# Patient Record
Sex: FEMALE | Race: WHITE | URBAN - METROPOLITAN AREA
[De-identification: names, ages, dates, MRNs, and addresses within clinical notes are randomized per-mention and may not be internally consistent; named-entity substitution may affect disease eponyms.]

---

## 2017-05-03 ENCOUNTER — NEW PATIENT (OUTPATIENT)
Dept: URBAN - METROPOLITAN AREA CLINIC 30 | Facility: CLINIC | Age: 72
End: 2017-05-03
Payer: COMMERCIAL

## 2017-05-03 DIAGNOSIS — H25.813 COMBINED FORMS OF AGE-RELATED CATARACT, BILATERAL: ICD-10-CM

## 2017-05-03 PROCEDURE — 92134 CPTRZ OPH DX IMG PST SGM RTA: CPT | Performed by: OPTOMETRIST

## 2017-05-03 PROCEDURE — 83861 MICROFLUID ANALY TEARS: CPT | Performed by: OPTOMETRIST

## 2017-05-03 RX ORDER — LEVOTHYROXINE SODIUM 50 UG/1
CAPSULE ORAL
Qty: 0 | Refills: 0 | Status: INACTIVE
Start: 2017-05-03 | End: 2017-06-06

## 2017-05-03 RX ORDER — CHOLECALCIFEROL (VITAMIN D3) 50 MCG
CAPSULE ORAL
Qty: 0 | Refills: 0 | Status: INACTIVE
Start: 2017-05-03 | End: 2018-03-27

## 2017-05-03 RX ORDER — IBUPROFEN 200 MG/1
200 MG TABLET, COATED ORAL
Qty: 0 | Refills: 0 | Status: INACTIVE
Start: 2017-05-03 | End: 2018-09-25

## 2017-05-03 RX ORDER — POLYETHYLENE GLYCOL 400 AND PROPYLENE GLYCOL 4; 3 MG/ML; MG/ML
SOLUTION/ DROPS OPHTHALMIC
Qty: 0 | Refills: 0 | Status: INACTIVE
Start: 2017-05-03 | End: 2021-01-07

## 2017-05-03 RX ORDER — DIPHENHYDRAMINE HCL 2 %
25 MG CREAM (GRAM) TOPICAL
Qty: 0 | Refills: 0 | Status: INACTIVE
Start: 2017-05-03 | End: 2018-09-25

## 2017-05-03 RX ORDER — OMEPRAZOLE 20 MG/1
20 MG TABLET, DELAYED RELEASE ORAL
Qty: 0 | Refills: 0 | Status: INACTIVE
Start: 2017-05-03 | End: 2018-09-25

## 2017-05-03 RX ORDER — METOPROLOL TARTRATE 100 MG/1
100 MG TABLET, FILM COATED ORAL
Qty: 0 | Refills: 0 | Status: INACTIVE
Start: 2017-05-03 | End: 2018-09-25

## 2017-05-03 RX ORDER — ZOLPIDEM TARTRATE 10 MG/1
10 MG TABLET, FILM COATED ORAL
Qty: 0 | Refills: 0 | Status: INACTIVE
Start: 2017-05-03 | End: 2018-07-03

## 2017-05-03 RX ORDER — BENAZEPRIL HYDROCHLORIDE 40 MG/1
40 MG TABLET, COATED ORAL
Qty: 0 | Refills: 0 | Status: INACTIVE
Start: 2017-05-03 | End: 2018-09-25

## 2017-05-03 RX ORDER — PREDNISOLONE ACETATE 10 MG/ML
1 % SUSPENSION/ DROPS OPHTHALMIC
Qty: 5 | Refills: 0 | Status: INACTIVE
Start: 2017-05-03 | End: 2017-06-22

## 2017-05-03 RX ORDER — SERTRALINE HYDROCHLORIDE 50 MG/1
50 MG TABLET, FILM COATED ORAL
Qty: 0 | Refills: 0 | Status: INACTIVE
Start: 2017-05-03 | End: 2018-03-27

## 2017-05-03 ASSESSMENT — KERATOMETRY
OS: 42.97
OD: 42.85

## 2017-05-03 ASSESSMENT — VISUAL ACUITY
OS: 20/60
OD: 20/40

## 2017-05-03 ASSESSMENT — INTRAOCULAR PRESSURE
OD: 11
OS: 10

## 2017-06-01 ENCOUNTER — FOLLOW UP ESTABLISHED (OUTPATIENT)
Dept: URBAN - METROPOLITAN AREA CLINIC 24 | Facility: CLINIC | Age: 72
End: 2017-06-01
Payer: MEDICARE

## 2017-06-01 DIAGNOSIS — H16.223 KERATOCONJUNCTIVITIS SICCA, BILATERAL: Primary | ICD-10-CM

## 2017-06-01 PROCEDURE — 92012 INTRM OPH EXAM EST PATIENT: CPT | Performed by: OPTOMETRIST

## 2017-06-01 PROCEDURE — 68761 CLOSE TEAR DUCT OPENING: CPT | Performed by: OPTOMETRIST

## 2017-06-06 ENCOUNTER — Encounter (OUTPATIENT)
Dept: URBAN - METROPOLITAN AREA CLINIC 30 | Facility: CLINIC | Age: 72
End: 2017-06-06
Payer: MEDICARE

## 2017-06-06 PROCEDURE — 92025 CPTRIZED CORNEAL TOPOGRAPHY: CPT | Performed by: OPHTHALMOLOGY

## 2017-06-06 PROCEDURE — 99213 OFFICE O/P EST LOW 20 MIN: CPT | Performed by: NURSE PRACTITIONER

## 2017-06-06 RX ORDER — DILTIAZEM HYDROCHLORIDE 240 MG/1
240 MG CAPSULE, EXTENDED RELEASE ORAL
Qty: 0 | Refills: 0 | Status: INACTIVE
Start: 2017-06-06 | End: 2017-06-06

## 2017-06-09 ENCOUNTER — FOLLOW UP ESTABLISHED (OUTPATIENT)
Dept: URBAN - METROPOLITAN AREA CLINIC 24 | Facility: CLINIC | Age: 72
End: 2017-06-09
Payer: MEDICARE

## 2017-06-09 DIAGNOSIS — H02.055 TRICHIASIS WITHOUT ENTROPION, LEFT LOWER LID: ICD-10-CM

## 2017-06-09 DIAGNOSIS — H25.812 COMBINED FORMS OF AGE-RELATED CATARACT, LEFT EYE: Primary | ICD-10-CM

## 2017-06-09 DIAGNOSIS — M79.7 FIBROMYALGIA: ICD-10-CM

## 2017-06-09 DIAGNOSIS — H25.811 COMBINED FORMS OF AGE-RELATED CATARACT, RIGHT EYE: ICD-10-CM

## 2017-06-09 DIAGNOSIS — H43.392 OTHER VITREOUS OPACITIES, LEFT EYE: ICD-10-CM

## 2017-06-09 PROCEDURE — 92014 COMPRE OPH EXAM EST PT 1/>: CPT | Performed by: OPHTHALMOLOGY

## 2017-06-09 ASSESSMENT — INTRAOCULAR PRESSURE
OD: 13
OS: 14

## 2017-06-16 ENCOUNTER — SURGERY (OUTPATIENT)
Dept: URBAN - METROPOLITAN AREA SURGERY 12 | Facility: SURGERY | Age: 72
End: 2017-06-16
Payer: MEDICARE

## 2017-06-17 ENCOUNTER — POST OP (OUTPATIENT)
Dept: URBAN - METROPOLITAN AREA CLINIC 24 | Facility: CLINIC | Age: 72
End: 2017-06-17

## 2017-06-17 PROCEDURE — 99024 POSTOP FOLLOW-UP VISIT: CPT | Performed by: OPTOMETRIST

## 2017-06-17 ASSESSMENT — INTRAOCULAR PRESSURE: OS: 24

## 2017-06-22 ENCOUNTER — POST OP (OUTPATIENT)
Dept: URBAN - METROPOLITAN AREA CLINIC 30 | Facility: CLINIC | Age: 72
End: 2017-06-22
Payer: MEDICARE

## 2017-06-22 PROCEDURE — 99024 POSTOP FOLLOW-UP VISIT: CPT | Performed by: OPTOMETRIST

## 2017-06-22 ASSESSMENT — INTRAOCULAR PRESSURE
OD: 13
OS: 13

## 2017-06-22 ASSESSMENT — VISUAL ACUITY
OS: 20/20
OD: 20/40

## 2017-06-28 ENCOUNTER — SURGERY (OUTPATIENT)
Dept: URBAN - METROPOLITAN AREA SURGERY 12 | Facility: SURGERY | Age: 72
End: 2017-06-28
Payer: MEDICARE

## 2017-06-29 ENCOUNTER — POST OP (OUTPATIENT)
Dept: URBAN - METROPOLITAN AREA CLINIC 30 | Facility: CLINIC | Age: 72
End: 2017-06-29

## 2017-06-29 PROCEDURE — 99024 POSTOP FOLLOW-UP VISIT: CPT | Performed by: OPTOMETRIST

## 2017-06-29 ASSESSMENT — INTRAOCULAR PRESSURE
OS: 14
OD: 20

## 2017-07-12 ENCOUNTER — POST OP (OUTPATIENT)
Dept: URBAN - METROPOLITAN AREA CLINIC 30 | Facility: CLINIC | Age: 72
End: 2017-07-12

## 2017-07-12 PROCEDURE — 99024 POSTOP FOLLOW-UP VISIT: CPT | Performed by: OPTOMETRIST

## 2017-07-12 ASSESSMENT — VISUAL ACUITY
OD: 20/20
OS: 20/20

## 2017-07-12 ASSESSMENT — INTRAOCULAR PRESSURE
OD: 16
OS: 16

## 2017-09-08 ENCOUNTER — POST OP (OUTPATIENT)
Dept: URBAN - METROPOLITAN AREA CLINIC 30 | Facility: CLINIC | Age: 72
End: 2017-09-08
Payer: COMMERCIAL

## 2017-09-08 PROCEDURE — 99024 POSTOP FOLLOW-UP VISIT: CPT | Performed by: OPTOMETRIST

## 2017-09-08 RX ORDER — PREDNISOLONE ACETATE 10 MG/ML
1 % SUSPENSION/ DROPS OPHTHALMIC
Qty: 1 | Refills: 0 | Status: INACTIVE
Start: 2017-09-08 | End: 2017-10-26

## 2017-09-08 ASSESSMENT — INTRAOCULAR PRESSURE
OS: 11
OD: 11

## 2017-09-08 ASSESSMENT — VISUAL ACUITY
OS: 20/20
OD: 20/20

## 2017-11-03 ENCOUNTER — FOLLOW UP ESTABLISHED (OUTPATIENT)
Dept: URBAN - METROPOLITAN AREA CLINIC 24 | Facility: CLINIC | Age: 72
End: 2017-11-03
Payer: COMMERCIAL

## 2017-11-03 DIAGNOSIS — H43.812 VITREOUS DEGENERATION, LEFT EYE: Primary | ICD-10-CM

## 2017-11-03 DIAGNOSIS — M35.01 SICCA SYNDROME WITH KERATOCONJUNCTIVITIS: ICD-10-CM

## 2017-11-03 DIAGNOSIS — C50.919: ICD-10-CM

## 2017-11-03 DIAGNOSIS — Z96.1 PRESENCE OF INTRAOCULAR LENS: ICD-10-CM

## 2017-11-03 PROCEDURE — 92014 COMPRE OPH EXAM EST PT 1/>: CPT | Performed by: OPTOMETRIST

## 2017-11-03 ASSESSMENT — INTRAOCULAR PRESSURE
OS: 10
OD: 10

## 2018-02-08 ENCOUNTER — FOLLOW UP ESTABLISHED (OUTPATIENT)
Dept: URBAN - METROPOLITAN AREA CLINIC 30 | Facility: CLINIC | Age: 73
End: 2018-02-08
Payer: COMMERCIAL

## 2018-02-08 DIAGNOSIS — H00.025 HORDEOLUM INTERNUM (MEIBOMIAN GLAND DYSFUNCTION), LEFT LOWER LID: ICD-10-CM

## 2018-02-08 PROCEDURE — 0330T TEAR FILM IMG UNI/BI W/I&R: CPT | Performed by: OPTOMETRIST

## 2018-02-08 PROCEDURE — 92012 INTRM OPH EXAM EST PATIENT: CPT | Performed by: OPTOMETRIST

## 2018-02-08 RX ORDER — PREDNISOLONE ACETATE 10 MG/ML
1 % SUSPENSION/ DROPS OPHTHALMIC
Qty: 5 | Refills: 0 | Status: INACTIVE
Start: 2018-02-08 | End: 2018-07-03

## 2018-03-05 ENCOUNTER — FOLLOW UP ESTABLISHED (OUTPATIENT)
Dept: URBAN - METROPOLITAN AREA CLINIC 30 | Facility: CLINIC | Age: 73
End: 2018-03-05
Payer: COMMERCIAL

## 2018-03-05 DIAGNOSIS — H00.022 HORDEOLUM INTERNUM (MEIBOMIAN GLAND DYSFUNCTION), RIGHT LOWER LID: ICD-10-CM

## 2018-03-05 DIAGNOSIS — H00.024 HORDEOLUM INTERNUM (MEIBOMIAN GLAND DYSFUNCTION), LEFT UPPER LID: ICD-10-CM

## 2018-03-05 PROCEDURE — 92012 INTRM OPH EXAM EST PATIENT: CPT | Performed by: OPTOMETRIST

## 2018-03-05 PROCEDURE — 83861 MICROFLUID ANALY TEARS: CPT | Performed by: OPTOMETRIST

## 2018-03-05 ASSESSMENT — INTRAOCULAR PRESSURE
OS: 13
OD: 14

## 2018-03-27 ENCOUNTER — CONSULT (OUTPATIENT)
Dept: URBAN - METROPOLITAN AREA CLINIC 26 | Facility: CLINIC | Age: 73
End: 2018-03-27
Payer: COMMERCIAL

## 2018-03-27 PROCEDURE — 92285 EXTERNAL OCULAR PHOTOGRAPHY: CPT | Performed by: OPHTHALMOLOGY

## 2018-03-27 PROCEDURE — 99214 OFFICE O/P EST MOD 30 MIN: CPT | Performed by: OPHTHALMOLOGY

## 2018-03-27 PROCEDURE — 67820 REVISE EYELASHES: CPT | Performed by: OPHTHALMOLOGY

## 2018-05-22 ENCOUNTER — FOLLOW UP ESTABLISHED (OUTPATIENT)
Dept: URBAN - METROPOLITAN AREA CLINIC 26 | Facility: CLINIC | Age: 73
End: 2018-05-22
Payer: COMMERCIAL

## 2018-05-22 PROCEDURE — 67825 REVISE EYELASHES: CPT | Performed by: OPHTHALMOLOGY

## 2018-05-22 PROCEDURE — 92012 INTRM OPH EXAM EST PATIENT: CPT | Performed by: OPHTHALMOLOGY

## 2018-05-22 PROCEDURE — 92285 EXTERNAL OCULAR PHOTOGRAPHY: CPT | Performed by: OPHTHALMOLOGY

## 2018-07-03 ENCOUNTER — FOLLOW UP ESTABLISHED (OUTPATIENT)
Dept: URBAN - METROPOLITAN AREA CLINIC 30 | Facility: CLINIC | Age: 73
End: 2018-07-03
Payer: COMMERCIAL

## 2018-07-03 DIAGNOSIS — H00.021 HORDEOLUM INTERNUM (MEIBOMIAN GLAND DYSFUNCTION), RIGHT UPPER LID: ICD-10-CM

## 2018-07-03 PROCEDURE — 99213 OFFICE O/P EST LOW 20 MIN: CPT | Performed by: OPTOMETRIST

## 2018-09-25 ENCOUNTER — FOLLOW UP ESTABLISHED (OUTPATIENT)
Dept: URBAN - METROPOLITAN AREA CLINIC 30 | Facility: CLINIC | Age: 73
End: 2018-09-25
Payer: COMMERCIAL

## 2018-09-25 PROCEDURE — 99282 EMERGENCY DEPT VISIT SF MDM: CPT | Performed by: OPTOMETRIST

## 2018-09-25 PROCEDURE — 68761 CLOSE TEAR DUCT OPENING: CPT | Performed by: OPTOMETRIST

## 2018-09-25 RX ORDER — PREDNISOLONE ACETATE 10 MG/ML
1 % SUSPENSION/ DROPS OPHTHALMIC
Qty: 15 | Refills: 0 | Status: INACTIVE
Start: 2018-09-25 | End: 2020-02-21

## 2018-09-25 ASSESSMENT — VISUAL ACUITY
OS: 20/20
OD: 20/20

## 2018-09-25 ASSESSMENT — INTRAOCULAR PRESSURE
OS: 14
OD: 14

## 2018-09-25 ASSESSMENT — KERATOMETRY
OS: 42.67
OD: 42.86

## 2018-12-31 ENCOUNTER — FOLLOW UP ESTABLISHED (OUTPATIENT)
Dept: URBAN - METROPOLITAN AREA CLINIC 30 | Facility: CLINIC | Age: 73
End: 2018-12-31
Payer: COMMERCIAL

## 2018-12-31 PROCEDURE — 68761 CLOSE TEAR DUCT OPENING: CPT | Performed by: OPTOMETRIST

## 2018-12-31 ASSESSMENT — INTRAOCULAR PRESSURE
OS: 11
OD: 12

## 2020-02-21 ENCOUNTER — FOLLOW UP ESTABLISHED (OUTPATIENT)
Dept: URBAN - METROPOLITAN AREA CLINIC 26 | Facility: CLINIC | Age: 75
End: 2020-02-21
Payer: MEDICARE

## 2020-02-21 PROCEDURE — 67820 REVISE EYELASHES: CPT | Performed by: OPTOMETRIST

## 2020-02-21 PROCEDURE — 92012 INTRM OPH EXAM EST PATIENT: CPT | Performed by: OPTOMETRIST

## 2020-02-21 RX ORDER — PREDNISOLONE ACETATE 10 MG/ML
1 % SUSPENSION/ DROPS OPHTHALMIC
Qty: 1 | Refills: 3 | Status: INACTIVE
Start: 2020-02-21 | End: 2020-10-23

## 2020-02-21 ASSESSMENT — INTRAOCULAR PRESSURE
OD: 13
OS: 13

## 2020-03-13 ENCOUNTER — FOLLOW UP ESTABLISHED (OUTPATIENT)
Dept: URBAN - METROPOLITAN AREA CLINIC 26 | Facility: CLINIC | Age: 75
End: 2020-03-13
Payer: MEDICARE

## 2020-03-13 DIAGNOSIS — H43.393 OTHER VITREOUS OPACITIES, BILATERAL: ICD-10-CM

## 2020-03-13 PROCEDURE — 92134 CPTRZ OPH DX IMG PST SGM RTA: CPT | Performed by: OPTOMETRIST

## 2020-03-13 PROCEDURE — 67820 REVISE EYELASHES: CPT | Performed by: OPTOMETRIST

## 2020-03-13 PROCEDURE — 92014 COMPRE OPH EXAM EST PT 1/>: CPT | Performed by: OPTOMETRIST

## 2020-03-13 ASSESSMENT — KERATOMETRY
OS: 42.88
OD: 43.00

## 2020-03-13 ASSESSMENT — VISUAL ACUITY
OD: 20/20
OS: 20/20

## 2020-03-13 ASSESSMENT — INTRAOCULAR PRESSURE
OD: 13
OS: 14

## 2020-10-23 ENCOUNTER — FOLLOW UP ESTABLISHED (OUTPATIENT)
Dept: URBAN - METROPOLITAN AREA CLINIC 26 | Facility: CLINIC | Age: 75
End: 2020-10-23
Payer: MEDICARE

## 2020-10-23 DIAGNOSIS — H52.4 PRESBYOPIA: ICD-10-CM

## 2020-10-23 DIAGNOSIS — H26.493 OTHER SECONDARY CATARACT, BILATERAL: ICD-10-CM

## 2020-10-23 DIAGNOSIS — H02.839 DERMATOCHALASIS OF UNSPECIFIED EYE, UNSPECIFIED EYELID: ICD-10-CM

## 2020-10-23 DIAGNOSIS — H35.3131 NONEXUDATIVE AGE-RELATED MACULAR DEGENERATION, BILATERAL, EARLY DRY STAGE: ICD-10-CM

## 2020-10-23 DIAGNOSIS — H16.143 PUNCTATE KERATITIS, BILATERAL: ICD-10-CM

## 2020-10-23 PROCEDURE — 92015 DETERMINE REFRACTIVE STATE: CPT | Performed by: OPTOMETRIST

## 2020-10-23 PROCEDURE — 92014 COMPRE OPH EXAM EST PT 1/>: CPT | Performed by: OPTOMETRIST

## 2020-10-23 PROCEDURE — 92134 CPTRZ OPH DX IMG PST SGM RTA: CPT | Performed by: OPTOMETRIST

## 2020-10-23 RX ORDER — PREDNISOLONE ACETATE 10 MG/ML
1 % SUSPENSION/ DROPS OPHTHALMIC
Qty: 0 | Refills: 0 | Status: INACTIVE
Start: 2020-10-23 | End: 2021-01-07

## 2020-10-23 ASSESSMENT — KERATOMETRY
OS: 42.75
OD: 43.00

## 2020-10-23 ASSESSMENT — INTRAOCULAR PRESSURE
OS: 19
OD: 17

## 2020-10-23 ASSESSMENT — VISUAL ACUITY
OD: 20/20
OS: 20/20

## 2021-01-07 ENCOUNTER — FOLLOW UP ESTABLISHED (OUTPATIENT)
Dept: URBAN - METROPOLITAN AREA CLINIC 30 | Facility: CLINIC | Age: 76
End: 2021-01-07
Payer: COMMERCIAL

## 2021-01-07 PROCEDURE — 92012 INTRM OPH EXAM EST PATIENT: CPT | Performed by: OPTOMETRIST

## 2021-01-07 PROCEDURE — 83861 MICROFLUID ANALY TEARS: CPT | Performed by: OPTOMETRIST

## 2021-01-07 RX ORDER — CYCLOSPORINE 0.5 MG/ML
0.05 % EMULSION OPHTHALMIC
Qty: 60 | Refills: 6 | Status: ACTIVE
Start: 2021-01-07

## 2021-01-07 RX ORDER — PREDNISOLONE ACETATE 10 MG/ML
1 % SUSPENSION/ DROPS OPHTHALMIC
Qty: 5 | Refills: 0 | Status: INACTIVE
Start: 2021-01-07 | End: 2021-03-08

## 2021-01-07 ASSESSMENT — INTRAOCULAR PRESSURE
OS: 13
OD: 15

## 2021-03-05 ENCOUNTER — FOLLOW UP ESTABLISHED (OUTPATIENT)
Dept: URBAN - METROPOLITAN AREA CLINIC 30 | Facility: CLINIC | Age: 76
End: 2021-03-05
Payer: COMMERCIAL

## 2021-03-05 PROCEDURE — 83861 MICROFLUID ANALY TEARS: CPT | Performed by: OPTOMETRIST

## 2021-03-05 ASSESSMENT — INTRAOCULAR PRESSURE
OD: 15
OS: 16

## 2021-05-03 ENCOUNTER — OFFICE VISIT (OUTPATIENT)
Dept: URBAN - METROPOLITAN AREA CLINIC 30 | Facility: CLINIC | Age: 76
End: 2021-05-03
Payer: COMMERCIAL

## 2021-05-03 PROCEDURE — 99214 OFFICE O/P EST MOD 30 MIN: CPT | Performed by: OPTOMETRIST

## 2021-05-03 RX ORDER — PREDNISOLONE ACETATE 10 MG/ML
1 % SUSPENSION/ DROPS OPHTHALMIC
Qty: 5 | Refills: 1 | Status: INACTIVE
Start: 2021-05-03 | End: 2021-05-04

## 2021-05-03 ASSESSMENT — INTRAOCULAR PRESSURE
OS: 16
OD: 16

## 2021-05-03 NOTE — IMPRESSION/PLAN
Impression: Sicca syndrome with keratoconjunctivitis: OU +Sjogrens ****Lipiview results 90% atrohy OU****
03/2021 Osmo 300, 305
01/2021 Osmo [phone]
01/2021 Schirmers 6,5 Schirmers-10,16 Plan: Pt notes excessive tears OS > OD and FBS OS since BUL plugs placed. Symptoms persists OU. Continue oil-based AT's qid OU- Using Systane complete, Systane ultra, Hydrate PF, TID OU. O3's. Avoid ceiling fans. Finished PA 1% OU-restart-RX'd, pt will use PRN w periodic IOP check. ***Pt ed risk of steroid induced GLC. Pt states will only use for 2-3 day period then d/c. Intolerant to Restasis and Xiidra- Re challenge Restasis consider AT's after. [place] Oil PRN-  feels helpful. Hx of allergies-cont Pataday tid OU. Cont WC's w blinking. Recommend IPL x 2 first to see response. RTC 4 months IOP check 03/2021 BUL 0.5mm UltraPlugs placed-**removed KRUNAL today due to excessive tearing and FBS.**
12/2018 replaced large Odyssey BLL, recommend cautery if displace-removed RLL for now 3/5/21-some mucus. Pt currently taking edible CBD & THC for hand and foot neuropathy which pt was told would cause major dryness. DEC 01/2021. Lipiview results 90% atrophy OU. Discussed options for treatment such as IPL x4 in order to maintain MG function. Pt aware of out of pocket cost $350.

## 2021-10-05 ENCOUNTER — OFFICE VISIT (OUTPATIENT)
Dept: URBAN - METROPOLITAN AREA CLINIC 30 | Facility: CLINIC | Age: 76
End: 2021-10-05
Payer: COMMERCIAL

## 2021-10-05 PROCEDURE — 99213 OFFICE O/P EST LOW 20 MIN: CPT | Performed by: OPTOMETRIST

## 2021-10-05 RX ORDER — PREDNISOLONE ACETATE 10 MG/ML
1 % SUSPENSION/ DROPS OPHTHALMIC
Qty: 5 | Refills: 0 | Status: ACTIVE
Start: 2021-10-05

## 2021-10-05 ASSESSMENT — INTRAOCULAR PRESSURE
OD: 15
OS: 14

## 2021-10-05 NOTE — IMPRESSION/PLAN
Impression: Hordeolum internum (Meibomian gland dysfunction), right upper lid Plan: See other notes. Cont 's qhs OU.

## 2021-10-05 NOTE — IMPRESSION/PLAN
Impression: Sicca syndrome with keratoconjunctivitis: OU +Sjogrens ****Lipiview results 90% atrohy OU****
03/2021 Osmo 300, 305
01/2021 Osmo [phone]
01/2021 Schirmers 6,5 Schirmers-10,16 Plan:  Symptoms persists OU. Continue oil-based AT's qid OU- Using Systane complete OU, and Refresh Optive- omega 3. O3's. Avoid ceiling fans. Using Restasis bid OU- tolerating. Renew PA 1%, pt will use PRN w periodic IOP check. ***Pt ed risk of steroid induced GLC. Pt states will only use for 2-3 day period then d/c. [place] Oil PRN-  feels helpful. Hx of allergies-cont Pataday tid OU. Cont WC's w blinking. Recommend IPL x 2 first to see response. Consider AS tears/ Amniotic tears. 03/2021 RUL 0.5mm UltraPlug remains in place. 12/2018 Large Odyssey LLL

**removed KRUNAL and RLL today due to excessive tearing and FBS.**

Pt currently taking edible CBD & THC for hand and foot neuropathy which pt was told would cause major dryness. DEC 01/2021. Lipiview results 90% atrophy OU. Discussed options for treatment such as IPL x4 in order to maintain MG function. Pt aware of out of pocket cost $350.

## 2022-03-24 ENCOUNTER — OFFICE VISIT (OUTPATIENT)
Dept: URBAN - METROPOLITAN AREA CLINIC 30 | Facility: CLINIC | Age: 77
End: 2022-03-24
Payer: COMMERCIAL

## 2022-03-24 PROCEDURE — 99214 OFFICE O/P EST MOD 30 MIN: CPT | Performed by: OPTOMETRIST

## 2022-03-24 RX ORDER — PREDNISOLONE ACETATE 10 MG/ML
1 % SUSPENSION/ DROPS OPHTHALMIC
Qty: 5 | Refills: 0 | Status: INACTIVE
Start: 2022-03-24 | End: 2022-03-24

## 2022-03-24 ASSESSMENT — INTRAOCULAR PRESSURE
OD: 15
OS: 14

## 2022-03-24 NOTE — IMPRESSION/PLAN
Impression: Sicca syndrome with keratoconjunctivitis: OU +Sjogrens ****Lipiview results 90% atrophy OU****
01/2021 Osmo [phone];  Started Chemo/immunotherapy 2/2022 Rectal melanoma
01/2021 Schirmers 6,5 Plan: Some ocular pain OD today. Continue oil-based AT's qid OU- cont using Systane complete OU, and Refresh Optive- omega 3. O3's. Avoid ceiling fans. Using Restasis bid OU- tolerating. ***Pt ed risk of steroid induced GLC. Pt states will only use for 2-3 day period then d/c. [place] Oil QHS-  feels helpful. Cont sleep mask. Hx of allergies-cont Pataday tid OU. Cont WC's w blinking. Recommend IPL x 2 first to see response. Consider AS tears/ Amniotic tears. Restart PA 1% TID for 4-6 weeks. 03/2021 RUL 0.5mm UltraPlug remains in place. 12/2018 Large Odyssey LLL

**removed KRUNAL and RLL today due to excessive tearing and FBS.**

Pt currently taking edible CBD & THC for hand and foot neuropathy which pt was told would cause major dryness. DEC 01/2021. Lipiview results 90% atrophy OU. Discussed options for treatment such as IPL x4 in order to maintain MG function. Pt aware of out of pocket cost $350.

## 2022-07-21 ENCOUNTER — OFFICE VISIT (OUTPATIENT)
Dept: URBAN - METROPOLITAN AREA CLINIC 30 | Facility: CLINIC | Age: 77
End: 2022-07-21
Payer: COMMERCIAL

## 2022-07-21 DIAGNOSIS — H43.393 OTHER VITREOUS OPACITIES, BILATERAL: ICD-10-CM

## 2022-07-21 DIAGNOSIS — M35.01 SICCA SYNDROME WITH KERATOCONJUNCTIVITIS: ICD-10-CM

## 2022-07-21 DIAGNOSIS — H00.021 HORDEOLUM INTERNUM RIGHT UPPER EYELID: ICD-10-CM

## 2022-07-21 DIAGNOSIS — C50.919 MALIGNANT NEOPLASM OF UNSPECIFIED SITE OF UNSPECIFIED FEMALE BREAST: ICD-10-CM

## 2022-07-21 DIAGNOSIS — H35.3131 NONEXUDATIVE AGE-RELATED MACULAR DEGENERATION, BILATERAL, EARLY DRY STAGE: Primary | ICD-10-CM

## 2022-07-21 PROCEDURE — 92134 CPTRZ OPH DX IMG PST SGM RTA: CPT | Performed by: OPTOMETRIST

## 2022-07-21 PROCEDURE — 99214 OFFICE O/P EST MOD 30 MIN: CPT | Performed by: OPTOMETRIST

## 2022-07-21 ASSESSMENT — VISUAL ACUITY
OD: 20/20
OS: 20/20

## 2022-07-21 ASSESSMENT — INTRAOCULAR PRESSURE
OS: 16
OD: 16

## 2022-07-21 NOTE — IMPRESSION/PLAN
Impression: Hordeolum internum (Meibomian gland dysfunction), right upper lid Plan: See other notes. Continue 's qhs OU.

## 2022-07-21 NOTE — IMPRESSION/PLAN
Impression: Cancer of female breast Plan: Pt feels chemo has affected her [facility]. 


[[S/P BILATERAL MASTECTOMY WITH PREVIOUS CHEMOTHERAPY. 
CONTRIBUTING FACTOR FOR [facility].]]

## 2022-07-21 NOTE — IMPRESSION/PLAN
Impression: Other vitreous opacities, bilateral Plan: There is no evidence of retinal pathology. All signs and risks of retinal detachment or tears were discussed in detail. If pt. notices any symptoms discussed, contact office ASAP. Recommend pt. return for normal recall. See today's MAC OCT. minimum assist (75% patients effort)

## 2022-07-21 NOTE — IMPRESSION/PLAN
Impression: Sicca syndrome with keratoconjunctivitis: OU +Sjogrens ****Lipiview results 90% atrophy OU****
01/2021 Osmo [phone];  Started Chemo/immunotherapy 2/2022 Rectal melanoma
01/2021 Schirmers 6,5 Plan: Much improvement in symptoms. Some ocular pain OD previously. Continue oil-based AT's qid OU- cont using Systane complete OU. O3's. Avoid ceiling fans. Using Restasis bid OU- tolerating. ***Pt ed risk of steroid induced GLC. Pt states will only use for 2-3 day period then d/c-- pt an keep reserve to use PRN-- **OKAY FOR ONE MORE REFILL BEFORE FU IN 4 MONTHS**. [place] Oil QHS-  feels helpful. Cont sleep mask. Hx of allergies-cont Pataday tid OU. Cont WC's w blinking. Recommend IPL x 2 first to see response. Consider AS tears/ Amniotic tears. 03/2021 RUL 0.5mm UltraPlug remains in place. 12/2018 Large Odyssey LLL
**removed KRUNAL and RLL today due to excessive tearing and FBS.**

Pt currently taking edible CBD & THC for hand and foot neuropathy which pt was told would cause major dryness. DEC 01/2021. Lipiview results 90% atrophy OU. Discussed options for treatment such as IPL x4 in order to maintain MG function. Pt aware of out of pocket cost $350.

## 2022-07-21 NOTE — IMPRESSION/PLAN
Impression: Nonexudative macular degeneration, early dry stage, bilateral Plan: Remains mild and stable. Recommend diet rich in green leafy vegetables. Continue to monitor.